# Patient Record
Sex: FEMALE | Race: WHITE | NOT HISPANIC OR LATINO | URBAN - METROPOLITAN AREA
[De-identification: names, ages, dates, MRNs, and addresses within clinical notes are randomized per-mention and may not be internally consistent; named-entity substitution may affect disease eponyms.]

---

## 2020-07-25 ENCOUNTER — TELEPHONE ENCOUNTER (OUTPATIENT)
Dept: URBAN - METROPOLITAN AREA CLINIC 13 | Facility: CLINIC | Age: 30
End: 2020-07-25

## 2020-07-26 ENCOUNTER — TELEPHONE ENCOUNTER (OUTPATIENT)
Dept: URBAN - METROPOLITAN AREA CLINIC 13 | Facility: CLINIC | Age: 30
End: 2020-07-26

## 2020-07-26 RX ORDER — IBUPROFEN 200 MG/1
TAKE TABLET  PRN TABLET, COATED ORAL
Refills: 0 | Status: ACTIVE | COMMUNITY

## 2020-07-26 RX ORDER — DROSPIRENONE AND ETHINYL ESTRADIOL 0.02-3(28)
TAKE 1 TABLET DAILY KIT ORAL
Qty: 1 | Refills: 0 | Status: ACTIVE | COMMUNITY

## 2020-07-26 RX ORDER — PSEUDOEPHEDRINE HCL 30 MG
TAKE TABLET  PRN TABLET ORAL
Refills: 0 | Status: ACTIVE | COMMUNITY

## 2022-11-16 ENCOUNTER — TELEPHONE ENCOUNTER (OUTPATIENT)
Dept: URBAN - METROPOLITAN AREA CLINIC 113 | Facility: CLINIC | Age: 32
End: 2022-11-16

## 2022-12-20 ENCOUNTER — OFFICE VISIT (OUTPATIENT)
Dept: URBAN - METROPOLITAN AREA CLINIC 113 | Facility: CLINIC | Age: 32
End: 2022-12-20
Payer: OTHER GOVERNMENT

## 2022-12-20 ENCOUNTER — WEB ENCOUNTER (OUTPATIENT)
Dept: URBAN - METROPOLITAN AREA CLINIC 113 | Facility: CLINIC | Age: 32
End: 2022-12-20

## 2022-12-20 VITALS
TEMPERATURE: 97.3 F | BODY MASS INDEX: 26.24 KG/M2 | HEART RATE: 57 BPM | RESPIRATION RATE: 18 BRPM | DIASTOLIC BLOOD PRESSURE: 67 MMHG | WEIGHT: 139 LBS | SYSTOLIC BLOOD PRESSURE: 112 MMHG | HEIGHT: 61 IN

## 2022-12-20 DIAGNOSIS — M45.9 ANKYLOSING SPONDYLITIS, UNSPECIFIED SITE OF SPINE: ICD-10-CM

## 2022-12-20 DIAGNOSIS — R19.7 DIARRHEA: ICD-10-CM

## 2022-12-20 PROBLEM — 9631008: Status: ACTIVE | Noted: 2022-12-20

## 2022-12-20 PROCEDURE — 99204 OFFICE O/P NEW MOD 45 MIN: CPT | Performed by: NURSE PRACTITIONER

## 2022-12-20 RX ORDER — DROSPIRENONE AND ETHINYL ESTRADIOL 0.02-3(28)
TAKE 1 TABLET DAILY KIT ORAL
Qty: 1 | Refills: 0 | Status: ON HOLD | COMMUNITY

## 2022-12-20 RX ORDER — PSEUDOEPHEDRINE HCL 30 MG
TAKE TABLET  PRN TABLET ORAL
Refills: 0 | Status: ON HOLD | COMMUNITY

## 2022-12-20 RX ORDER — ESCITALOPRAM OXALATE 20 MG/1
1 TABLET TABLET, FILM COATED ORAL ONCE A DAY
Status: ACTIVE | COMMUNITY

## 2022-12-20 RX ORDER — IBUPROFEN 200 MG/1
TAKE TABLET  PRN TABLET, COATED ORAL
Refills: 0 | Status: ON HOLD | COMMUNITY

## 2022-12-20 NOTE — HPI-TODAY'S VISIT:
33yo female with a history of eczema, asthma, newly diagnosed ankylosing spondylitis, presenting for evaluation of diarrhea.   She was seen in the office in March 2018 for evaluation of diarrhea. A colonoscopy was recommended, but never performed. She was encouraged a low-FODMAP diet.  She continues to struggle with diarrhea, which has been ongoing for the last 15 years. She has 1-4 loose stools per day. She has occasional postprandial urgency, but no nocturnal defecation or blood per rectum. She has excess gas which responds to Gas-X when needed. No nausea, vomiting or weight loss. She continues to follow a modified low-FODMAP diet. 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 She is being managed by rheumatology, Dr. Vaz. She was previously on Simponi but is currently going through the authorization process for Cimzia.

## 2023-01-03 ENCOUNTER — TELEPHONE ENCOUNTER (OUTPATIENT)
Dept: URBAN - METROPOLITAN AREA CLINIC 113 | Facility: CLINIC | Age: 33
End: 2023-01-03

## 2023-01-13 ENCOUNTER — TELEPHONE ENCOUNTER (OUTPATIENT)
Dept: URBAN - METROPOLITAN AREA CLINIC 113 | Facility: CLINIC | Age: 33
End: 2023-01-13

## 2023-01-13 ENCOUNTER — CLAIMS CREATED FROM THE CLAIM WINDOW (OUTPATIENT)
Dept: URBAN - METROPOLITAN AREA CLINIC 4 | Facility: CLINIC | Age: 33
End: 2023-01-13
Payer: OTHER GOVERNMENT

## 2023-01-13 ENCOUNTER — LAB OUTSIDE AN ENCOUNTER (OUTPATIENT)
Dept: URBAN - METROPOLITAN AREA CLINIC 113 | Facility: CLINIC | Age: 33
End: 2023-01-13

## 2023-01-13 ENCOUNTER — OUT OF OFFICE VISIT (OUTPATIENT)
Dept: URBAN - METROPOLITAN AREA SURGERY CENTER 25 | Facility: SURGERY CENTER | Age: 33
End: 2023-01-13
Payer: OTHER GOVERNMENT

## 2023-01-13 DIAGNOSIS — R19.7 CHRONIC DIARRHEA: ICD-10-CM

## 2023-01-13 DIAGNOSIS — K63.89 OTHER SPECIFIED DISEASES OF INTESTINE: ICD-10-CM

## 2023-01-13 DIAGNOSIS — R19.7 ACUTE DIARRHEA: ICD-10-CM

## 2023-01-13 PROCEDURE — 00811 ANES LWR INTST NDSC NOS: CPT | Performed by: ANESTHESIOLOGIST ASSISTANT

## 2023-01-13 PROCEDURE — 88305 TISSUE EXAM BY PATHOLOGIST: CPT | Performed by: PATHOLOGY

## 2023-01-13 PROCEDURE — 45380 COLONOSCOPY AND BIOPSY: CPT | Performed by: INTERNAL MEDICINE

## 2023-01-13 PROCEDURE — G8907 PT DOC NO EVENTS ON DISCHARG: HCPCS | Performed by: INTERNAL MEDICINE

## 2023-01-13 PROCEDURE — 00811 ANES LWR INTST NDSC NOS: CPT | Performed by: ANESTHESIOLOGY

## 2023-01-13 RX ORDER — IBUPROFEN 200 MG/1
TAKE TABLET  PRN TABLET, COATED ORAL
Refills: 0 | Status: ON HOLD | COMMUNITY

## 2023-01-13 RX ORDER — PSEUDOEPHEDRINE HCL 30 MG
TAKE TABLET  PRN TABLET ORAL
Refills: 0 | Status: ON HOLD | COMMUNITY

## 2023-01-13 RX ORDER — DROSPIRENONE AND ETHINYL ESTRADIOL 0.02-3(28)
TAKE 1 TABLET DAILY KIT ORAL
Qty: 1 | Refills: 0 | Status: ON HOLD | COMMUNITY

## 2023-01-13 RX ORDER — ESCITALOPRAM OXALATE 20 MG/1
1 TABLET TABLET, FILM COATED ORAL ONCE A DAY
Status: ACTIVE | COMMUNITY

## 2023-01-13 RX ORDER — COLESEVELAM HYDROCHLORIDE 625 MG/1
1 TABLET TABLET, FILM COATED ORAL ONCE A DAY
Qty: 30 | Refills: 11 | OUTPATIENT
Start: 2023-01-13

## 2023-01-16 LAB
ENDOMYSIAL ANTIBODY SCR: NEGATIVE
GLIADIN (DEAMIDATED): <1
GLIADIN (DEAMIDATED): <1
IMMUNOGLOBULIN A: 137
IMMUNOGLOBULIN G: 1615
IMMUNOGLOBULIN M: 101
TISSUE TRANSGLUTAMINASE AB, IGA: <1
TISSUE TRANSGLUTAMINASE AB, IGG: <1

## 2023-02-06 ENCOUNTER — OFFICE VISIT (OUTPATIENT)
Dept: URBAN - METROPOLITAN AREA CLINIC 113 | Facility: CLINIC | Age: 33
End: 2023-02-06

## 2023-02-10 ENCOUNTER — DASHBOARD ENCOUNTERS (OUTPATIENT)
Age: 33
End: 2023-02-10

## 2023-02-10 ENCOUNTER — OFFICE VISIT (OUTPATIENT)
Dept: URBAN - METROPOLITAN AREA CLINIC 113 | Facility: CLINIC | Age: 33
End: 2023-02-10
Payer: OTHER GOVERNMENT

## 2023-02-10 VITALS
WEIGHT: 136 LBS | DIASTOLIC BLOOD PRESSURE: 70 MMHG | SYSTOLIC BLOOD PRESSURE: 100 MMHG | HEIGHT: 61 IN | TEMPERATURE: 97.5 F | BODY MASS INDEX: 25.68 KG/M2 | RESPIRATION RATE: 18 BRPM | HEART RATE: 67 BPM

## 2023-02-10 DIAGNOSIS — R19.7 DIARRHEA: ICD-10-CM

## 2023-02-10 PROCEDURE — 99214 OFFICE O/P EST MOD 30 MIN: CPT | Performed by: NURSE PRACTITIONER

## 2023-02-10 RX ORDER — COLESEVELAM HYDROCHLORIDE 625 MG/1
1 TABLET TABLET, FILM COATED ORAL TWICE A DAY
Qty: 60 TABLET | Refills: 3 | OUTPATIENT
Start: 2023-02-10

## 2023-02-10 RX ORDER — COLESEVELAM HYDROCHLORIDE 625 MG/1
1 TABLET TABLET, FILM COATED ORAL ONCE A DAY
Qty: 30 | Refills: 11 | Status: ACTIVE | COMMUNITY
Start: 2023-01-13

## 2023-02-10 RX ORDER — ESCITALOPRAM OXALATE 20 MG/1
1 TABLET TABLET, FILM COATED ORAL ONCE A DAY
Status: ACTIVE | COMMUNITY

## 2023-02-10 RX ORDER — DROSPIRENONE AND ETHINYL ESTRADIOL 0.02-3(28)
TAKE 1 TABLET DAILY KIT ORAL
Qty: 1 | Refills: 0 | Status: ON HOLD | COMMUNITY

## 2023-02-10 RX ORDER — IBUPROFEN 200 MG/1
TAKE TABLET  PRN TABLET, COATED ORAL
Refills: 0 | Status: ON HOLD | COMMUNITY

## 2023-02-10 RX ORDER — PSEUDOEPHEDRINE HCL 30 MG
TAKE TABLET  PRN TABLET ORAL
Refills: 0 | Status: ON HOLD | COMMUNITY

## 2023-02-10 NOTE — HPI-OTHER HISTORIES
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 She is being managed by rheumatology, Dr. Vaz. She was previously on Simponi but is currently going through the authorization process for Cimzia.

## 2023-02-10 NOTE — HPI-TODAY'S VISIT:
She was seen in the office in December for evaluation of chronic diarrhea, suspected to be related to a functional bowel disorder.  Given recent diagnosis of ankylosing spondylitis/HLA-B27 positive, a diagnostic colonoscopy was recommended to exclude underlying colorectal pathology including inflammatory bowel disease.  WelChol was considered. She had a normal colonoscopy on 1/13/2023.  Random biopsies throughout the colon were unremarkable, negative for colitis. Celiac serologies on 1/13/2023 were negative. She was started on WelChol 1 tablet daily following the colonoscopy.  She continues to have frequent bowel movements 3-4 times per day, but the stools have become more formed.  Her stool urgency has subsided.  She denies blood per rectum.  She has occasional gas which responds to Gas-X when needed.  She is trying to follow a low FODMAP diet.  No localized abdominal pain, nausea or vomiting.

## 2023-02-10 NOTE — HPI-TODAY'S VISIT:
31yo female with a history of eczema, asthma, ankylosing spondylitis, presenting for follow-up of diarrhea.

## 2024-08-05 ENCOUNTER — OFFICE VISIT (OUTPATIENT)
Dept: URBAN - METROPOLITAN AREA CLINIC 113 | Facility: CLINIC | Age: 34
End: 2024-08-05
Payer: OTHER GOVERNMENT

## 2024-08-05 VITALS
RESPIRATION RATE: 18 BRPM | TEMPERATURE: 97.9 F | BODY MASS INDEX: 27.34 KG/M2 | DIASTOLIC BLOOD PRESSURE: 93 MMHG | SYSTOLIC BLOOD PRESSURE: 128 MMHG | HEIGHT: 61 IN | HEART RATE: 70 BPM | WEIGHT: 144.8 LBS

## 2024-08-05 DIAGNOSIS — K90.89 BILE SALT-INDUCED DIARRHEA: ICD-10-CM

## 2024-08-05 DIAGNOSIS — K58.8 OTHER IRRITABLE BOWEL SYNDROME: ICD-10-CM

## 2024-08-05 DIAGNOSIS — M45.9 ANKYLOSING SPONDYLITIS, UNSPECIFIED SITE OF SPINE: ICD-10-CM

## 2024-08-05 PROCEDURE — 99212 OFFICE O/P EST SF 10 MIN: CPT | Performed by: INTERNAL MEDICINE

## 2024-08-05 RX ORDER — COLESEVELAM HYDROCHLORIDE 625 MG/1
1 TABLET TABLET, FILM COATED ORAL TWICE A DAY
Qty: 60 TABLET | Refills: 0 | Status: ACTIVE | COMMUNITY

## 2024-08-05 RX ORDER — COLESEVELAM HYDROCHLORIDE 625 MG/1
1 TABLET TABLET, FILM COATED ORAL TWICE A DAY
Qty: 180 TABLET | Refills: 3 | OUTPATIENT
Start: 2024-08-05

## 2024-08-05 NOTE — PHYSICAL EXAM HENT:
Head,  normocephalic,  atraumatic, Ears,  External ears within normal limits,  Nose/Nasopharynx,  External nose  normal appearance  Lips,  Appearance normal

## 2024-08-05 NOTE — HPI-TODAY'S VISIT:
34-year-old female with history of ankylosing spondylitis and irritable bowel syndrome presents for follow-up.  She is actually doing much better at this time. At the time of her colonoscopy we prescribed WelChol. This medication worked very nicely for her symptoms. She has resolution of diarrhea. No rectal bleeding melena. No abdominal pain. Weight is stable. No reflux symptoms. She is here for medication refill.  Labs.  July 2024.  CBC and CMP were normal.  Hepatitis B antibody positive.  Colonoscopy.  January 2023.  Normal colonic mucosa.  Random biopsies were all normal.  Occupation.  She is a pharmacist at [employer]